# Patient Record
Sex: FEMALE | Race: WHITE | NOT HISPANIC OR LATINO | Employment: PART TIME | URBAN - NONMETROPOLITAN AREA
[De-identification: names, ages, dates, MRNs, and addresses within clinical notes are randomized per-mention and may not be internally consistent; named-entity substitution may affect disease eponyms.]

---

## 2017-01-16 RX ORDER — LISINOPRIL AND HYDROCHLOROTHIAZIDE 12.5; 1 MG/1; MG/1
TABLET ORAL
Qty: 30 TABLET | Refills: 0 | Status: SHIPPED | OUTPATIENT
Start: 2017-01-16 | End: 2017-02-15 | Stop reason: SDUPTHER

## 2017-02-15 RX ORDER — LISINOPRIL AND HYDROCHLOROTHIAZIDE 12.5; 1 MG/1; MG/1
TABLET ORAL
Qty: 30 TABLET | Refills: 0 | Status: SHIPPED | OUTPATIENT
Start: 2017-02-15 | End: 2017-03-17 | Stop reason: SDUPTHER

## 2017-03-17 RX ORDER — LISINOPRIL AND HYDROCHLOROTHIAZIDE 12.5; 1 MG/1; MG/1
TABLET ORAL
Qty: 30 TABLET | Refills: 0 | Status: SHIPPED | OUTPATIENT
Start: 2017-03-17 | End: 2017-03-23 | Stop reason: DRUGHIGH

## 2017-03-17 RX ORDER — MONTELUKAST SODIUM 10 MG/1
TABLET ORAL
Qty: 30 TABLET | Refills: 0 | Status: SHIPPED | OUTPATIENT
Start: 2017-03-17 | End: 2017-03-23 | Stop reason: SDUPTHER

## 2017-03-23 ENCOUNTER — OFFICE VISIT (OUTPATIENT)
Dept: INTERNAL MEDICINE | Facility: CLINIC | Age: 52
End: 2017-03-23

## 2017-03-23 VITALS
SYSTOLIC BLOOD PRESSURE: 120 MMHG | WEIGHT: 202.9 LBS | DIASTOLIC BLOOD PRESSURE: 100 MMHG | HEIGHT: 67 IN | BODY MASS INDEX: 31.84 KG/M2

## 2017-03-23 DIAGNOSIS — E66.9 OBESITY (BMI 30.0-34.9): ICD-10-CM

## 2017-03-23 DIAGNOSIS — F32.A DEPRESSIVE DISORDER: ICD-10-CM

## 2017-03-23 DIAGNOSIS — J30.9 ALLERGIC RHINITIS, UNSPECIFIED ALLERGIC RHINITIS TRIGGER, UNSPECIFIED RHINITIS SEASONALITY: ICD-10-CM

## 2017-03-23 DIAGNOSIS — I10 ESSENTIAL HYPERTENSION: Primary | ICD-10-CM

## 2017-03-23 PROCEDURE — 99214 OFFICE O/P EST MOD 30 MIN: CPT | Performed by: INTERNAL MEDICINE

## 2017-03-23 RX ORDER — FLUTICASONE PROPIONATE 50 MCG
2 SPRAY, SUSPENSION (ML) NASAL DAILY
Qty: 1 EACH | Refills: 5 | Status: SHIPPED | OUTPATIENT
Start: 2017-03-23 | End: 2017-04-22

## 2017-03-23 RX ORDER — MONTELUKAST SODIUM 10 MG/1
10 TABLET ORAL NIGHTLY
Qty: 30 TABLET | Refills: 5 | Status: SHIPPED | OUTPATIENT
Start: 2017-03-23 | End: 2017-09-25 | Stop reason: SDUPTHER

## 2017-03-23 RX ORDER — ZOLPIDEM TARTRATE 5 MG/1
5 TABLET ORAL NIGHTLY PRN
Qty: 30 TABLET | Refills: 0 | Status: SHIPPED | OUTPATIENT
Start: 2017-03-23 | End: 2017-09-25 | Stop reason: SDUPTHER

## 2017-03-23 RX ORDER — LISINOPRIL AND HYDROCHLOROTHIAZIDE 20; 12.5 MG/1; MG/1
1 TABLET ORAL DAILY
Qty: 30 TABLET | Refills: 5 | Status: SHIPPED | OUTPATIENT
Start: 2017-03-23 | End: 2017-09-20 | Stop reason: SDUPTHER

## 2017-03-23 NOTE — PROGRESS NOTES
Subjective   Bennie King is a 51 y.o. female       Patient is in for recheck on HTN, allergies and anxiety.    Her BP has been elevated. She denies any chest pain, dyspnea or edema in lower extremities.  Patient is compliant with her therapy and takes medication as prescribed.  She has been trying to walk several times a week.    Allergies are controlled on Singulair. She denies nasal or sinus congestion. Denies cough or wheezing.    Patient complains of worsening anxiety and depression, She is feeling stressed out all the time. Not sleeping well at night.  She has been taking Ambien on PRN basis.    Past Medical History:   Diagnosis Date   • Allergic rhinitis    • Depression    • History of mammogram 01/14/2015   • Hypertension    • Obesity      Past Surgical History:   Procedure Laterality Date   • APPENDECTOMY     • ELBOW PROCEDURE      Repair of tennis elbow (1)    • INJECTION OF MEDICATION  03/23/2015    Kenalog (2)      • INJECTION OF MEDICATION  12/21/2010    Phenergan (1)      • INJECTION OF MEDICATION  10/20/2014    Toradol (1)          Social History   Substance Use Topics   • Smoking status: Former Smoker   • Smokeless tobacco: Not on file      Comment: smoked 1 pack a day for 10 years, quit years ago   • Alcohol use Yes      Comment: SOCIAL     Family History   Problem Relation Age of Onset   • Diabetes Other    • Hypertension Other    • Prostate cancer Other      No Known Allergies  Current Outpatient Prescriptions on File Prior to Visit   Medication Sig Dispense Refill   • lisinopril-hydrochlorothiazide (PRINZIDE,ZESTORETIC) 10-12.5 MG per tablet TAKE ONE TABLET BY MOUTH DAILY 30 tablet 0   • montelukast (SINGULAIR) 10 MG tablet TAKE ONE TABLET BY MOUTH DAILY 30 tablet 0   • norgestimate-ethinyl estradiol (TRI-SPRINTEC) 0.18/0.215/0.25 MG-35 MCG per tablet Take 1 tablet by mouth Daily. 28 tablet 11     No current facility-administered medications on file prior to visit.      Review of Systems    Constitutional: Negative for activity change and fatigue.   HENT: Negative for nosebleeds, postnasal drip and sore throat.    Respiratory: Negative for chest tightness.    Cardiovascular: Negative for chest pain, palpitations and leg swelling.   Gastrointestinal: Negative for abdominal pain, constipation and diarrhea.   Genitourinary: Negative for flank pain and frequency.   Psychiatric/Behavioral: Positive for sleep disturbance. The patient is nervous/anxious.        Objective   Physical Exam   Constitutional: She is oriented to person, place, and time. She appears well-developed and well-nourished.   HENT:   Head: Normocephalic and atraumatic.   Nose: Nose normal.   Mouth/Throat: Oropharynx is clear and moist.   Eyes: Conjunctivae are normal. Pupils are equal, round, and reactive to light. Left eye exhibits no discharge. No scleral icterus.   Neck: Neck supple. No thyromegaly present.   Cardiovascular: Normal rate and regular rhythm.    No murmur heard.  Pulmonary/Chest: Effort normal and breath sounds normal.   Abdominal: Soft. Bowel sounds are normal. She exhibits no mass.   Neurological: She is alert and oriented to person, place, and time. She has normal reflexes.   Psychiatric: She has a normal mood and affect. Her behavior is normal.   Nursing note and vitals reviewed.          There is no problem list on file for this patient.              Assessment    Diagnosis Plan   1. Essential hypertension  Lipid Panel    Hemoglobin A1c    Comprehensive Metabolic Panel   2. Obesity (BMI 30.0-34.9)  Lipid Panel    Hemoglobin A1c    Comprehensive Metabolic Panel    TSH   3. Allergic rhinitis, unspecified allergic rhinitis trigger, unspecified rhinitis seasonality     4. Depressive disorder           Plan     1. Patient will increase Lisinopril to 20/12.5 mg daily.      DASH diet.      1.5 gr Sodium restriction.      She will monitor her BP at home.  2. Patient is counseled on calories restricted diet.      Advised to  increase physical activity to 30 minutes daily.  3. Patient will continue Singulair 10 mg daily and Flonase 2 puffs daily.  4. She will be started on Lexapro 10 mg daily.      Side effects of the medications discussed with the patient.      Refill given on Ambien 5 mg qhs for PPN use.      Follow up in 3 months.

## 2017-03-27 RX ORDER — ESCITALOPRAM OXALATE 10 MG/1
10 TABLET ORAL DAILY
Qty: 90 TABLET | Refills: 1 | Status: SHIPPED | OUTPATIENT
Start: 2017-03-27 | End: 2017-09-20 | Stop reason: SDUPTHER

## 2017-04-07 ENCOUNTER — TRANSCRIBE ORDERS (OUTPATIENT)
Dept: GENERAL RADIOLOGY | Facility: CLINIC | Age: 52
End: 2017-04-07

## 2017-04-07 DIAGNOSIS — I10 ESSENTIAL HYPERTENSION, BENIGN: Primary | ICD-10-CM

## 2017-04-12 ENCOUNTER — TELEPHONE (OUTPATIENT)
Dept: INTERNAL MEDICINE | Facility: CLINIC | Age: 52
End: 2017-04-12

## 2017-04-12 NOTE — TELEPHONE ENCOUNTER
Spoke with pt let her know lab shows her bs was prediabetic range DR HERNÁNDEZ  Recommends  Referral to diabetic teacher and in future may need to be placed on metformin i let her know i sent order they will call her with an appt,,,,

## 2017-04-17 ENCOUNTER — OFFICE VISIT (OUTPATIENT)
Dept: INTERNAL MEDICINE | Facility: CLINIC | Age: 52
End: 2017-04-17

## 2017-04-17 VITALS
SYSTOLIC BLOOD PRESSURE: 110 MMHG | DIASTOLIC BLOOD PRESSURE: 80 MMHG | WEIGHT: 197.5 LBS | HEIGHT: 67 IN | BODY MASS INDEX: 31 KG/M2

## 2017-04-17 DIAGNOSIS — F41.1 GENERALIZED ANXIETY DISORDER: ICD-10-CM

## 2017-04-17 DIAGNOSIS — I10 ESSENTIAL HYPERTENSION: Primary | ICD-10-CM

## 2017-04-17 PROCEDURE — 99212 OFFICE O/P EST SF 10 MIN: CPT | Performed by: INTERNAL MEDICINE

## 2017-04-17 NOTE — PROGRESS NOTES
Subjective   Bennie King is a 51 y.o. female     She returns for recheck on HTN and anxiety.    Lisinopril was increased to 20/12.5 mg daily couple of weeks ago. Her BP is coming down. She has been checking it at home, and it has been better controlled.  She denies any cardiovascular complaints.  Patient  has been cutting on calories in her diet as she is trying to lose weight.    Anxiety is better on Lexapro. She is feeling less nervous. Tolerates medication well without any side effects.    Past Medical History:   Diagnosis Date   • Allergic rhinitis    • Depression    • History of mammogram 01/14/2015   • Hypertension    • Obesity      Past Surgical History:   Procedure Laterality Date   • APPENDECTOMY     • ELBOW PROCEDURE      Repair of tennis elbow (1)    • INJECTION OF MEDICATION  03/23/2015    Kenalog (2)      • INJECTION OF MEDICATION  12/21/2010    Phenergan (1)      • INJECTION OF MEDICATION  10/20/2014    Toradol (1)          Social History   Substance Use Topics   • Smoking status: Former Smoker   • Smokeless tobacco: Not on file      Comment: smoked 1 pack a day for 10 years, quit years ago   • Alcohol use Yes      Comment: SOCIAL     Family History   Problem Relation Age of Onset   • Diabetes Other    • Hypertension Other    • Prostate cancer Other      No Known Allergies  Current Outpatient Prescriptions on File Prior to Visit   Medication Sig Dispense Refill   • escitalopram (LEXAPRO) 10 MG tablet Take 1 tablet by mouth Daily. 90 tablet 1   • fluticasone (FLONASE) 50 MCG/ACT nasal spray 2 sprays into each nostril Daily for 30 days. 1 each 5   • lisinopril-hydrochlorothiazide (ZESTORETIC) 20-12.5 MG per tablet Take 1 tablet by mouth Daily. 30 tablet 5   • montelukast (SINGULAIR) 10 MG tablet Take 1 tablet by mouth Every Night. 30 tablet 5   • norgestimate-ethinyl estradiol (TRI-SPRINTEC) 0.18/0.215/0.25 MG-35 MCG per tablet Take 1 tablet by mouth Daily. 28 tablet 11   • zolpidem (AMBIEN) 5 MG  tablet Take 1 tablet by mouth At Night As Needed for Sleep. 30 tablet 0     No current facility-administered medications on file prior to visit.      Review of Systems   Constitutional: Negative for activity change and fatigue.   Respiratory: Negative for chest tightness and shortness of breath.    Cardiovascular: Negative for chest pain, palpitations and leg swelling.   Endocrine: Negative for cold intolerance, heat intolerance and polydipsia.   Genitourinary: Negative for flank pain and frequency.   Neurological: Negative for dizziness and light-headedness.       Objective   Physical Exam   Constitutional: She appears well-developed and well-nourished.   HENT:   Head: Normocephalic and atraumatic.   Neck: Neck supple. No JVD present. No thyromegaly present.   Cardiovascular: Normal rate and regular rhythm.    No murmur heard.  Pulmonary/Chest: Effort normal and breath sounds normal.   Abdominal: Soft. Bowel sounds are normal. She exhibits no mass.   Musculoskeletal: Normal range of motion.   Neurological: She is alert. She has normal reflexes.   Psychiatric: She has a normal mood and affect. Her behavior is normal.   Nursing note and vitals reviewed.          Patient Active Problem List   Diagnosis   • Essential hypertension   • Generalized anxiety disorder               Assessment    Diagnosis Plan   1. Essential hypertension     2. Generalized anxiety disorder           Plan     1. Patient will continue current dose of Lisinopril.      DASH.      1.5 gr Sodium restriction.      Advised on calories restricted diet and weight loss.  2. Lexapro will be continued.      She is taking Ambien on PRN basis only.      She will follow up in 6 months.

## 2017-08-30 RX ORDER — NORGESTIMATE AND ETHINYL ESTRADIOL 7DAYSX3 28
KIT ORAL
Qty: 28 TABLET | Refills: 10 | OUTPATIENT
Start: 2017-08-30

## 2017-09-20 RX ORDER — LISINOPRIL AND HYDROCHLOROTHIAZIDE 20; 12.5 MG/1; MG/1
TABLET ORAL
Qty: 30 TABLET | Refills: 1 | Status: SHIPPED | OUTPATIENT
Start: 2017-09-20 | End: 2017-09-25 | Stop reason: SDUPTHER

## 2017-09-20 RX ORDER — ESCITALOPRAM OXALATE 10 MG/1
TABLET ORAL
Qty: 90 TABLET | Refills: 0 | Status: SHIPPED | OUTPATIENT
Start: 2017-09-20 | End: 2017-09-25

## 2017-09-25 ENCOUNTER — OFFICE VISIT (OUTPATIENT)
Dept: INTERNAL MEDICINE | Facility: CLINIC | Age: 52
End: 2017-09-25

## 2017-09-25 VITALS
WEIGHT: 193 LBS | HEIGHT: 67 IN | SYSTOLIC BLOOD PRESSURE: 110 MMHG | BODY MASS INDEX: 30.29 KG/M2 | DIASTOLIC BLOOD PRESSURE: 90 MMHG

## 2017-09-25 DIAGNOSIS — R73.02 IMPAIRED GLUCOSE TOLERANCE: ICD-10-CM

## 2017-09-25 DIAGNOSIS — F41.1 GENERALIZED ANXIETY DISORDER: Primary | ICD-10-CM

## 2017-09-25 DIAGNOSIS — I10 ESSENTIAL HYPERTENSION: ICD-10-CM

## 2017-09-25 DIAGNOSIS — Z12.31 ENCOUNTER FOR SCREENING MAMMOGRAM FOR BREAST CANCER: ICD-10-CM

## 2017-09-25 DIAGNOSIS — J30.9 ALLERGIC RHINITIS, UNSPECIFIED ALLERGIC RHINITIS TRIGGER, UNSPECIFIED RHINITIS SEASONALITY: ICD-10-CM

## 2017-09-25 DIAGNOSIS — Z23 IMMUNIZATION DUE: ICD-10-CM

## 2017-09-25 PROCEDURE — 90471 IMMUNIZATION ADMIN: CPT | Performed by: INTERNAL MEDICINE

## 2017-09-25 PROCEDURE — 99214 OFFICE O/P EST MOD 30 MIN: CPT | Performed by: INTERNAL MEDICINE

## 2017-09-25 PROCEDURE — 90686 IIV4 VACC NO PRSV 0.5 ML IM: CPT | Performed by: INTERNAL MEDICINE

## 2017-09-25 RX ORDER — ESCITALOPRAM OXALATE 20 MG/1
20 TABLET ORAL DAILY
Qty: 30 TABLET | Refills: 5 | Status: SHIPPED | OUTPATIENT
Start: 2017-09-25 | End: 2018-04-26 | Stop reason: SDUPTHER

## 2017-09-25 RX ORDER — LISINOPRIL AND HYDROCHLOROTHIAZIDE 20; 12.5 MG/1; MG/1
1 TABLET ORAL DAILY
Qty: 30 TABLET | Refills: 5 | Status: SHIPPED | OUTPATIENT
Start: 2017-09-25 | End: 2018-04-26 | Stop reason: SDUPTHER

## 2017-09-25 RX ORDER — MONTELUKAST SODIUM 10 MG/1
10 TABLET ORAL NIGHTLY
Qty: 30 TABLET | Refills: 5 | Status: SHIPPED | OUTPATIENT
Start: 2017-09-25 | End: 2018-04-26 | Stop reason: SDUPTHER

## 2017-09-25 RX ORDER — ZOLPIDEM TARTRATE 5 MG/1
5 TABLET ORAL NIGHTLY PRN
Qty: 30 TABLET | Refills: 0 | Status: SHIPPED | OUTPATIENT
Start: 2017-09-25

## 2017-09-25 NOTE — PROGRESS NOTES
Subjective   Bennie King is a 52 y.o. female       Patient is in for recheck on HTN, allergies and anxiety.    Her BP is well controlled. She denies any cardiovascular complaints.  She is trying to exercise on daily basis.  She was able to lose about 4 lbs since last visit.  Glucose has been in prediabetic range with last HgbA1c 6.1.    Still having problems with anxiety. She is feeling anxious and stressed out. Has been having problems with insomnia.  Denies depressed mood.     Singulair is helping with allergies. Complains of postnasal drip and nasal congestion.  Denies cough, sputum production or wheezing.    Past Medical History:   Diagnosis Date   • Allergic rhinitis    • Depression    • History of mammogram 01/14/2015   • Hypertension    • Obesity      Past Surgical History:   Procedure Laterality Date   • APPENDECTOMY     • ELBOW PROCEDURE      Repair of tennis elbow (1)    • INJECTION OF MEDICATION  03/23/2015    Kenalog (2)      • INJECTION OF MEDICATION  12/21/2010    Phenergan (1)      • INJECTION OF MEDICATION  10/20/2014    Toradol (1)          Social History   Substance Use Topics   • Smoking status: Former Smoker   • Smokeless tobacco: Never Used      Comment: smoked 1 pack a day for 10 years, quit years ago   • Alcohol use Yes      Comment: SOCIAL     Family History   Problem Relation Age of Onset   • Diabetes Other    • Hypertension Other    • Prostate cancer Other      No Known Allergies  Current Outpatient Prescriptions on File Prior to Visit   Medication Sig Dispense Refill   • norgestimate-ethinyl estradiol (TRI-SPRINTEC) 0.18/0.215/0.25 MG-35 MCG per tablet Take 1 tablet by mouth Daily. 28 tablet 11   • [DISCONTINUED] escitalopram (LEXAPRO) 10 MG tablet TAKE ONE TABLET BY MOUTH DAILY 90 tablet 0   • [DISCONTINUED] lisinopril-hydrochlorothiazide (PRINZIDE,ZESTORETIC) 20-12.5 MG per tablet TAKE ONE TABLET BY MOUTH DAILY 30 tablet 1   • [DISCONTINUED] montelukast (SINGULAIR) 10 MG tablet  Take 1 tablet by mouth Every Night. 30 tablet 5   • [DISCONTINUED] zolpidem (AMBIEN) 5 MG tablet Take 1 tablet by mouth At Night As Needed for Sleep. 30 tablet 0     No current facility-administered medications on file prior to visit.      Review of Systems   Constitutional: Negative for fatigue.   HENT: Positive for postnasal drip. Negative for facial swelling, mouth sores, sinus pain and sore throat.    Eyes: Negative for photophobia and visual disturbance.   Respiratory: Negative for shortness of breath and wheezing.    Cardiovascular: Negative for chest pain, palpitations and leg swelling.   Gastrointestinal: Negative for abdominal pain, constipation and diarrhea.   Endocrine: Negative for cold intolerance and heat intolerance.   Genitourinary: Negative for flank pain and frequency.   Musculoskeletal: Negative for back pain.   Neurological: Negative for dizziness and light-headedness.   Psychiatric/Behavioral: Positive for sleep disturbance. The patient is nervous/anxious.        Objective   Physical Exam   Constitutional: She is oriented to person, place, and time. She appears well-developed and well-nourished.   HENT:   Head: Normocephalic and atraumatic.   Eyes: Conjunctivae are normal. Pupils are equal, round, and reactive to light.   Neck: Neck supple. No JVD present. No thyromegaly present.   Cardiovascular: Normal rate.    No murmur heard.  Pulmonary/Chest: Effort normal and breath sounds normal.   Abdominal: Soft. Bowel sounds are normal. She exhibits no mass.   Neurological: She is alert and oriented to person, place, and time. She has normal reflexes.   Skin: Skin is warm and dry. No rash noted.   Psychiatric: She has a normal mood and affect. Her behavior is normal. Thought content normal.   Nursing note and vitals reviewed.          Patient Active Problem List   Diagnosis   • Essential hypertension   • Generalized anxiety disorder               Assessment   Diagnosis Plan   1. Generalized anxiety  disorder     2. Essential hypertension     3. Impaired glucose tolerance  Basic Metabolic Panel    Hemoglobin A1c   4. Allergic rhinitis, unspecified allergic rhinitis trigger, unspecified rhinitis seasonality     5. Encounter for screening mammogram for breast cancer  Mammo Screening Digital Tomosynthesis Bilateral With CAD   6. Immunization due  Flu Vaccine Quad PF 3YR+ (FLUARIX/FLUZONE [id number])       Plan       1. Lexapro will be increased to 20 mg daily.      Ambien 5 mg qhs PRN.  2. Patient will continue Lisinopril.      DASH.      1.5 gr Sodium restriction.  3. HgbA1c will be rechecked.      Counseled on nutrition and weight loss.      Advised to cut carbohydrates in diet.  4. Patient will continue Singulair and Flonase.      Advised on allergen avoidance and elimination.              Follow up in 3 months.          This document has been electronically signed by Juana Gastelum MD on September 25, 2017 9:42 AM

## 2017-10-10 ENCOUNTER — TELEPHONE (OUTPATIENT)
Dept: FAMILY MEDICINE CLINIC | Facility: CLINIC | Age: 52
End: 2017-10-10

## 2017-10-10 NOTE — TELEPHONE ENCOUNTER
Pr Dr. Gastelum, Ms. King has been called with her recent lab results & recommendations.  Continue her current medications and follow-up as planned or sooner if any problems.       ---- Message from Juana Gastelum MD sent at 10/10/2017  1:42 AM CDT -----  Regarding: FW: Lab Results  Contact: [phone]  Glucose in elev but not diabetic, in prediabetic range  Re: ada diet and weight loss  Sodium is a little low probably from fluid pill in her bp meds  ----- Message -----     From: Smiley Dangelo MA     Sent: 10/6/2017   1:06 PM       To: Juana Gastelum MD  Subject: FW: Lab Results                                      ----- Message -----     From: Mahsa Saab     Sent: 10/4/2017   8:16 AM       To: Smiley Dangelo MA  Subject: Lab Results                                      Pt is wanting her lab results.

## 2018-02-06 NOTE — PROGRESS NOTES
Nutrition Services    Patient Name:  Bennie King  YOB: 1965  MRN: [id number]  Admit Date:  (Not on file)    Pt was referred by lion [facility] for weight mgt. Pt's insurance doesn't pay for Medical Nutrition Therapy. She was invited to a free weight management class for pt's whose insurance doesn't cover MNT. She didn't come to the class. I am willing to work with her if she desires in the future on an one on one basis.     Electronically signed by:  Ashley Najera RD  02/06/18 2:35 PM

## 2018-04-26 ENCOUNTER — OFFICE VISIT (OUTPATIENT)
Dept: INTERNAL MEDICINE | Facility: CLINIC | Age: 53
End: 2018-04-26

## 2018-04-26 VITALS
HEIGHT: 67 IN | WEIGHT: 211.5 LBS | BODY MASS INDEX: 33.19 KG/M2 | DIASTOLIC BLOOD PRESSURE: 90 MMHG | SYSTOLIC BLOOD PRESSURE: 110 MMHG

## 2018-04-26 DIAGNOSIS — F41.1 GENERALIZED ANXIETY DISORDER: ICD-10-CM

## 2018-04-26 DIAGNOSIS — Z12.11 SCREEN FOR COLON CANCER: ICD-10-CM

## 2018-04-26 DIAGNOSIS — I10 ESSENTIAL HYPERTENSION: Primary | ICD-10-CM

## 2018-04-26 DIAGNOSIS — R73.03 PREDIABETES: ICD-10-CM

## 2018-04-26 PROCEDURE — 99214 OFFICE O/P EST MOD 30 MIN: CPT | Performed by: INTERNAL MEDICINE

## 2018-04-26 RX ORDER — FLUTICASONE PROPIONATE 50 MCG
1 SPRAY, SUSPENSION (ML) NASAL DAILY
Qty: 1 BOTTLE | Refills: 5 | Status: SHIPPED | OUTPATIENT
Start: 2018-04-26

## 2018-04-26 RX ORDER — LISINOPRIL AND HYDROCHLOROTHIAZIDE 20; 12.5 MG/1; MG/1
1 TABLET ORAL DAILY
Qty: 30 TABLET | Refills: 5 | Status: SHIPPED | OUTPATIENT
Start: 2018-04-26

## 2018-04-26 RX ORDER — MONTELUKAST SODIUM 10 MG/1
10 TABLET ORAL NIGHTLY
Qty: 30 TABLET | Refills: 5 | Status: SHIPPED | OUTPATIENT
Start: 2018-04-26

## 2018-04-26 RX ORDER — ESCITALOPRAM OXALATE 20 MG/1
20 TABLET ORAL DAILY
Qty: 30 TABLET | Refills: 5 | Status: SHIPPED | OUTPATIENT
Start: 2018-04-26

## 2018-04-26 RX ORDER — FLUTICASONE PROPIONATE 50 MCG
1 SPRAY, SUSPENSION (ML) NASAL
COMMUNITY
End: 2018-04-26 | Stop reason: SDUPTHER

## 2018-05-27 PROBLEM — R73.03 PREDIABETES: Status: ACTIVE | Noted: 2018-05-27

## 2018-05-27 PROBLEM — Z12.11 SCREEN FOR COLON CANCER: Status: ACTIVE | Noted: 2018-05-27

## 2018-05-27 NOTE — PROGRESS NOTES
Subjective   Bennie King is a 53 y.o. female     Patient is in for recheck on HTN, prediabetes and anxiety.    Hypertension   This is a chronic problem. The current episode started more than 1 year ago. Associated symptoms include anxiety. Pertinent negatives include no chest pain, neck pain, palpitations, peripheral edema or shortness of breath. Agents associated with hypertension include steroids. Risk factors for coronary artery disease include obesity. Current antihypertension treatment includes ACE inhibitors and diuretics. There are no compliance problems.    Anxiety   Presents for initial visit. Onset was 1 to 5 years ago. The problem has been gradually improving. Symptoms include insomnia and nervous/anxious behavior. Patient reports no chest pain, compulsions, decreased concentration, depressed mood, palpitations or shortness of breath.       In terms pf prediabetes,She has not been watching carbs in her diet as well as she should.  Weight is up over 15 lbs.    Past Medical History:   Diagnosis Date   • Allergic rhinitis    • Depression    • History of mammogram 01/14/2015   • Hypertension    • Obesity      Past Surgical History:   Procedure Laterality Date   • APPENDECTOMY     • AUGMENTATION MAMMAPLASTY     • ELBOW PROCEDURE      Repair of tennis elbow (1)    • INJECTION OF MEDICATION  03/23/2015    Kenalog (2)      • INJECTION OF MEDICATION  12/21/2010    Phenergan (1)      • INJECTION OF MEDICATION  10/20/2014    Toradol (1)          Social History   Substance Use Topics   • Smoking status: Former Smoker   • Smokeless tobacco: Never Used      Comment: smoked 1 pack a day for 10 years, quit years ago   • Alcohol use Yes      Comment: SOCIAL     Family History   Problem Relation Age of Onset   • Diabetes Other    • Hypertension Other    • Prostate cancer Other      No Known Allergies  Current Outpatient Prescriptions on File Prior to Visit   Medication Sig Dispense Refill   • zolpidem (AMBIEN) 5 MG  tablet Take 1 tablet by mouth At Night As Needed for Sleep. 30 tablet 0     No current facility-administered medications on file prior to visit.      Review of Systems   Constitutional: Negative.    HENT: Negative.    Eyes: Negative.    Respiratory: Negative for shortness of breath.    Cardiovascular: Negative for chest pain, palpitations and leg swelling.   Gastrointestinal: Negative for abdominal pain, constipation and diarrhea.   Endocrine: Negative for cold intolerance, polydipsia, polyphagia and polyuria.   Musculoskeletal: Negative for back pain and neck pain.   Neurological: Negative for light-headedness and numbness.   Hematological: Negative for adenopathy. Does not bruise/bleed easily.   Psychiatric/Behavioral: Negative for decreased concentration and dysphoric mood. The patient is nervous/anxious and has insomnia.        Objective   Physical Exam   Constitutional: She is oriented to person, place, and time. She appears well-developed and well-nourished.   HENT:   Head: Atraumatic.   Nose: Nose normal.   Mouth/Throat: Oropharynx is clear and moist.   Eyes: Conjunctivae are normal. No scleral icterus.   Neck: Neck supple. No JVD present.   Cardiovascular: Normal rate and regular rhythm.    Pulmonary/Chest: Effort normal and breath sounds normal.   Abdominal: Soft. Bowel sounds are normal. She exhibits no mass.   Musculoskeletal: She exhibits no tenderness or deformity.   Neurological: She is alert and oriented to person, place, and time.   Skin: Skin is warm and dry.   Psychiatric: She has a normal mood and affect. Her behavior is normal.   Nursing note and vitals reviewed.          Patient Active Problem List   Diagnosis   • Essential hypertension   • Generalized anxiety disorder   • Prediabetes   • Screen for colon cancer               Assessment/Plan   Bennie was seen today for med refill.    Diagnoses and all orders for this visit:    Essential hypertension    Prediabetes  -     Hemoglobin A1c  -      Lipid Panel  -     Comprehensive metabolic panel  -     TSH    Generalized anxiety disorder    BMI 33.0-33.9,adult    Screen for colon cancer  -     Amb referral for Screening Colonoscopy    Other orders  -     escitalopram (LEXAPRO) 20 MG tablet; Take 1 tablet by mouth Daily.  -     lisinopril-hydrochlorothiazide (PRINZIDE,ZESTORETIC) 20-12.5 MG per tablet; Take 1 tablet by mouth Daily.  -     montelukast (SINGULAIR) 10 MG tablet; Take 1 tablet by mouth Every Night.  -     fluticasone (FLONASE) 50 MCG/ACT nasal spray; 1 spray into each nostril Daily.           Plan        1. Lisinopril/HCT.  DASH.  2. Discussed prediabetes and diabetes, diagnosis and prognosis.  ADA diet.  HgbA1c.  3.Lexapro 20 mg daily.  Off Ambien.  4.Weight loss discussed. Calories resticted diet.  Activity: Approximately 150 minutes of moderate activity (such as walking program)  per week (20-30 minutes most days of the week)  Diet: Heart healthy foods - more fresh fruits and vegetables and whole grains; less red meat; more fish and poultry that is baked or grilled - not fried; less salt not to exceed 2000 mg daily - less processed food; No trans or saturated fat  5.Referral for screening colonoscopy.        Follow up in 3 months.                    This document has been electronically signed by Juana Gastelum MD on May 27, 2018 1:49 PM

## 2018-06-02 ENCOUNTER — PREP FOR SURGERY (OUTPATIENT)
Dept: OTHER | Facility: HOSPITAL | Age: 53
End: 2018-06-02

## 2018-06-02 DIAGNOSIS — Z12.11 SCREENING FOR MALIGNANT NEOPLASM OF COLON: Primary | ICD-10-CM

## 2018-06-02 RX ORDER — DEXTROSE AND SODIUM CHLORIDE 5; .45 G/100ML; G/100ML
30 INJECTION, SOLUTION INTRAVENOUS CONTINUOUS PRN
Status: CANCELLED | OUTPATIENT
Start: 2018-06-26

## 2018-06-05 PROBLEM — Z12.11 SCREENING FOR MALIGNANT NEOPLASM OF COLON: Status: ACTIVE | Noted: 2018-06-05

## 2018-06-26 ENCOUNTER — ANESTHESIA EVENT (OUTPATIENT)
Dept: GASTROENTEROLOGY | Facility: HOSPITAL | Age: 53
End: 2018-06-26

## 2018-06-26 ENCOUNTER — ANESTHESIA (OUTPATIENT)
Dept: GASTROENTEROLOGY | Facility: HOSPITAL | Age: 53
End: 2018-06-26

## 2018-06-26 ENCOUNTER — HOSPITAL ENCOUNTER (OUTPATIENT)
Facility: HOSPITAL | Age: 53
Setting detail: HOSPITAL OUTPATIENT SURGERY
Discharge: HOME | End: 2018-06-26
Attending: SURGERY | Admitting: SURGERY

## 2018-06-26 VITALS
DIASTOLIC BLOOD PRESSURE: 67 MMHG | SYSTOLIC BLOOD PRESSURE: 113 MMHG | WEIGHT: 205.25 LBS | HEIGHT: 67 IN | TEMPERATURE: 97.7 F | OXYGEN SATURATION: 98 % | RESPIRATION RATE: 18 BRPM | BODY MASS INDEX: 32.21 KG/M2 | HEART RATE: 66 BPM

## 2018-06-26 DIAGNOSIS — Z12.11 SCREENING FOR MALIGNANT NEOPLASM OF COLON: ICD-10-CM

## 2018-06-26 PROCEDURE — 88305 TISSUE EXAM BY PATHOLOGIST: CPT | Performed by: PATHOLOGY

## 2018-06-26 PROCEDURE — 45380 COLONOSCOPY AND BIOPSY: CPT | Performed by: SURGERY

## 2018-06-26 PROCEDURE — 25010000002 PROPOFOL 10 MG/ML EMULSION: Performed by: NURSE ANESTHETIST, CERTIFIED REGISTERED

## 2018-06-26 PROCEDURE — 88305 TISSUE EXAM BY PATHOLOGIST: CPT | Performed by: SURGERY

## 2018-06-26 PROCEDURE — 25010000002 MIDAZOLAM PER 1 MG: Performed by: NURSE ANESTHETIST, CERTIFIED REGISTERED

## 2018-06-26 RX ORDER — DEXTROSE AND SODIUM CHLORIDE 5; .45 G/100ML; G/100ML
30 INJECTION, SOLUTION INTRAVENOUS CONTINUOUS PRN
Status: DISCONTINUED | OUTPATIENT
Start: 2018-06-26 | End: 2018-06-26 | Stop reason: HOSPADM

## 2018-06-26 RX ORDER — PROMETHAZINE HYDROCHLORIDE 25 MG/ML
12.5 INJECTION, SOLUTION INTRAMUSCULAR; INTRAVENOUS ONCE AS NEEDED
Status: DISCONTINUED | OUTPATIENT
Start: 2018-06-26 | End: 2018-06-26 | Stop reason: HOSPADM

## 2018-06-26 RX ORDER — PROPOFOL 10 MG/ML
VIAL (ML) INTRAVENOUS AS NEEDED
Status: DISCONTINUED | OUTPATIENT
Start: 2018-06-26 | End: 2018-06-26 | Stop reason: SURG

## 2018-06-26 RX ORDER — PROMETHAZINE HYDROCHLORIDE 25 MG/1
25 SUPPOSITORY RECTAL ONCE AS NEEDED
Status: DISCONTINUED | OUTPATIENT
Start: 2018-06-26 | End: 2018-06-26 | Stop reason: HOSPADM

## 2018-06-26 RX ORDER — PROMETHAZINE HYDROCHLORIDE 25 MG/1
25 TABLET ORAL ONCE AS NEEDED
Status: DISCONTINUED | OUTPATIENT
Start: 2018-06-26 | End: 2018-06-26 | Stop reason: HOSPADM

## 2018-06-26 RX ORDER — ONDANSETRON 2 MG/ML
4 INJECTION INTRAMUSCULAR; INTRAVENOUS ONCE AS NEEDED
Status: DISCONTINUED | OUTPATIENT
Start: 2018-06-26 | End: 2018-06-26 | Stop reason: HOSPADM

## 2018-06-26 RX ORDER — MIDAZOLAM HYDROCHLORIDE 1 MG/ML
INJECTION INTRAMUSCULAR; INTRAVENOUS AS NEEDED
Status: DISCONTINUED | OUTPATIENT
Start: 2018-06-26 | End: 2018-06-26 | Stop reason: SURG

## 2018-06-26 RX ADMIN — PROPOFOL 20 MG: 10 INJECTION, EMULSION INTRAVENOUS at 09:05

## 2018-06-26 RX ADMIN — PROPOFOL 80 MG: 10 INJECTION, EMULSION INTRAVENOUS at 09:04

## 2018-06-26 RX ADMIN — PROPOFOL 30 MG: 10 INJECTION, EMULSION INTRAVENOUS at 09:08

## 2018-06-26 RX ADMIN — MIDAZOLAM 2 MG: 1 INJECTION INTRAMUSCULAR; INTRAVENOUS at 09:01

## 2018-06-26 RX ADMIN — PROPOFOL 70 MG: 10 INJECTION, EMULSION INTRAVENOUS at 09:12

## 2018-06-26 RX ADMIN — DEXTROSE AND SODIUM CHLORIDE 30 ML/HR: 5; 450 INJECTION, SOLUTION INTRAVENOUS at 08:53

## 2018-06-26 NOTE — ANESTHESIA PREPROCEDURE EVALUATION
Anesthesia Evaluation     Patient summary reviewed and Nursing notes reviewed   NPO Solid Status: > 8 hours  NPO Liquid Status: > 4 hours           Airway   Mallampati: II  TM distance: >3 FB  Neck ROM: full  Dental - normal exam     Pulmonary - normal exam   (+) a smoker Former,   Cardiovascular - normal exam    (+) hypertension well controlled less than 2 medications,       Neuro/Psych  (+) psychiatric history Anxiety,     GI/Hepatic/Renal/Endo    (+) obesity,       Musculoskeletal     Abdominal  - normal exam   Substance History      OB/GYN          Other                      Anesthesia Plan    ASA 2     MAC     intravenous induction   Anesthetic plan and risks discussed with patient.

## 2018-06-26 NOTE — ANESTHESIA POSTPROCEDURE EVALUATION
Patient: Bennie King    Procedure Summary     Date:  06/26/18 Room / Location:  [facility] ENDOSCOPY 1 / [facility] ENDOSCOPY    Anesthesia Start:  0903 Anesthesia Stop:  0921    Procedure:  COLONOSCOPY (N/A ) Diagnosis:       Screening for malignant neoplasm of colon      (Screening for malignant neoplasm of colon [Z12.11])    Surgeon:  Konrad Turner MD Provider:  Bettie Mccray CRNA    Anesthesia Type:  MAC ASA Status:  2          Anesthesia Type: MAC  Last vitals  BP   106/75 (06/26/18 0838)   Temp   98.1 °F (36.7 °C) (06/26/18 0838)   Pulse   76 (06/26/18 0838)   Resp   18 (06/26/18 0838)     SpO2   98 % (06/26/18 0838)     Post Anesthesia Care and Evaluation    Patient location during evaluation: bedside  Patient participation: complete - patient participated  Level of consciousness: sleepy but conscious  Pain score: 0  Pain management: adequate  Airway patency: patent  Anesthetic complications: No anesthetic complications  PONV Status: none  Cardiovascular status: acceptable  Respiratory status: acceptable  Hydration status: acceptable

## 2018-06-27 LAB
LAB AP CASE REPORT: NORMAL
PATH REPORT.FINAL DX SPEC: NORMAL
PATH REPORT.GROSS SPEC: NORMAL

## (undated) DEVICE — SINGLE-USE BIOPSY FORCEPS: Brand: RADIAL JAW 4

## (undated) DEVICE — CANN SMPL SOFTECH BIFLO ETCO2 A/M 7FT